# Patient Record
(demographics unavailable — no encounter records)

---

## 2024-10-18 NOTE — PLAN
[FreeTextEntry1] : pt to have left breast sonogram.  pt to f/u prn.  pt educated to have pap smear at age 21.

## 2024-10-18 NOTE — PHYSICAL EXAM
[Appropriately responsive] : appropriately responsive [Alert] : alert [No Acute Distress] : no acute distress [No Lymphadenopathy] : no lymphadenopathy [Regular Rate Rhythm] : regular rate rhythm [No Murmurs] : no murmurs [Clear to Auscultation B/L] : clear to auscultation bilaterally [Soft] : soft [Non-tender] : non-tender [Non-distended] : non-distended [No HSM] : No HSM [No Lesions] : no lesions [No Mass] : no mass [Oriented x3] : oriented x3 [Examination Of The Breasts] : a normal appearance [Normal] : normal [Diffuse Fibrous Tissue In The Left Breast] : fibrocystic changes [No Masses] : no breast masses were palpable [Axillary Lymph Nodes Enlarged On The Left] : enlarged node on the left

## 2024-10-18 NOTE — REVIEW OF SYSTEMS
[Breast Pain] : breast pain [Breast Lump] : no breast lump [Nipple Changes] : no nipple changes [Nipple Discharge] : no nipple discharge [Negative] : Heme/Lymph

## 2024-10-18 NOTE — HISTORY OF PRESENT ILLNESS
[FreeTextEntry1] : pt, , presents today w/ c/o of left breast and axilla pain for one week. pt states at the time of onset, pain increased on full arm overhead extension and felt a pulling sensation from her axilla to her nipple. pt states since then her symptoms have lessened but are still present. pt denies nipple discharge, breast asymmetry or skin changes.  [N] : Patient is not sexually active [LMPDate] : 9/23/24 [MensesFreq] : 30 [MensesLength] : 5